# Patient Record
Sex: MALE | URBAN - METROPOLITAN AREA
[De-identification: names, ages, dates, MRNs, and addresses within clinical notes are randomized per-mention and may not be internally consistent; named-entity substitution may affect disease eponyms.]

---

## 2017-10-16 ENCOUNTER — APPOINTMENT (RX ONLY)
Dept: URBAN - METROPOLITAN AREA CLINIC 77 | Facility: CLINIC | Age: 22
Setting detail: DERMATOLOGY
End: 2017-10-16

## 2017-10-16 DIAGNOSIS — L21.8 OTHER SEBORRHEIC DERMATITIS: ICD-10-CM

## 2017-10-16 DIAGNOSIS — L50.3 DERMATOGRAPHIC URTICARIA: ICD-10-CM

## 2017-10-16 PROCEDURE — 99203 OFFICE O/P NEW LOW 30 MIN: CPT

## 2017-10-16 PROCEDURE — ? PRESCRIPTION

## 2017-10-16 PROCEDURE — ? COUNSELING

## 2017-10-16 PROCEDURE — ? TREATMENT REGIMEN

## 2017-10-16 RX ORDER — PIMECROLIMUS 10 MG/G
CREAM TOPICAL
Qty: 1 | Refills: 3 | Status: ERX | COMMUNITY
Start: 2017-10-16

## 2017-10-16 RX ORDER — SULFACETAMIDE SODIUM, SULFUR 100; 20 MG/G; MG/G
EMULSION TOPICAL
Qty: 1 | Refills: 5 | Status: ERX | COMMUNITY
Start: 2017-10-16

## 2017-10-16 RX ADMIN — PIMECROLIMUS: 10 CREAM TOPICAL at 18:26

## 2017-10-16 RX ADMIN — SULFACETAMIDE SODIUM, SULFUR: 100; 20 EMULSION TOPICAL at 18:26

## 2017-10-16 ASSESSMENT — LOCATION SIMPLE DESCRIPTION DERM
LOCATION SIMPLE: LEFT CHEEK
LOCATION SIMPLE: LEFT ANTERIOR NECK
LOCATION SIMPLE: RIGHT CHEEK

## 2017-10-16 ASSESSMENT — LOCATION DETAILED DESCRIPTION DERM
LOCATION DETAILED: LEFT INFERIOR CENTRAL MALAR CHEEK
LOCATION DETAILED: LEFT CLAVICULAR NECK
LOCATION DETAILED: RIGHT CENTRAL MALAR CHEEK

## 2017-10-16 ASSESSMENT — LOCATION ZONE DERM
LOCATION ZONE: NECK
LOCATION ZONE: FACE

## 2017-10-16 NOTE — PROCEDURE: TREATMENT REGIMEN
Detail Level: Zone
Plan: Location: face\\nPrescribe: Avar 10/2% cleanser, Elidel 1% cream\\nPt presents with erythematous scaly patches that can become irritating and bothersome\\nDiscussed with him that seborrheic dermatitis is a step further from dandruff, it is an inflammatory condition triggered with stress, lack of sleep, and also has a genetic component.\\nDiscussed with him that the regimen we will start him on is to help with inflammation\\nWill start him on Elidel cream, can use as much as necessary, is a non steroidal\\nDiscussed using a sulfa cleanser to help soothe and alleviate inflammation \\nDuring allergy season, recommend using Allegra bid to help calm down histamine production

## 2018-11-06 ENCOUNTER — APPOINTMENT (RX ONLY)
Dept: URBAN - METROPOLITAN AREA CLINIC 77 | Facility: CLINIC | Age: 23
Setting detail: DERMATOLOGY
End: 2018-11-06

## 2018-11-06 DIAGNOSIS — L21.8 OTHER SEBORRHEIC DERMATITIS: ICD-10-CM

## 2018-11-06 PROCEDURE — 99213 OFFICE O/P EST LOW 20 MIN: CPT

## 2018-11-06 PROCEDURE — ? TREATMENT REGIMEN

## 2018-11-06 PROCEDURE — ? PRESCRIPTION

## 2018-11-06 PROCEDURE — ? COUNSELING

## 2018-11-06 RX ORDER — DOXYCYCLINE HYCLATE 100 MG/1
TABLET, COATED ORAL
Qty: 60 | Refills: 2 | COMMUNITY
Start: 2018-11-06

## 2018-11-06 RX ADMIN — DOXYCYCLINE HYCLATE: 100 TABLET, COATED ORAL at 14:53

## 2018-11-06 ASSESSMENT — LOCATION ZONE DERM: LOCATION ZONE: FACE

## 2018-11-06 ASSESSMENT — LOCATION DETAILED DESCRIPTION DERM
LOCATION DETAILED: LEFT CENTRAL MALAR CHEEK
LOCATION DETAILED: RIGHT CENTRAL MALAR CHEEK

## 2018-11-06 ASSESSMENT — LOCATION SIMPLE DESCRIPTION DERM
LOCATION SIMPLE: LEFT CHEEK
LOCATION SIMPLE: RIGHT CHEEK

## 2018-11-06 NOTE — PROCEDURE: TREATMENT REGIMEN
Detail Level: Zone
Plan: Location: face\\nPrevious treatment: Avar 10/2% cleanser and elidel 1% cream\\nPrescribe: Doryx 120mg P.O. qd x 30 days \\nSample: Soolantra 1% cream and Avar 10/2% cream \\nPatient is here for a follow up after using the sulfa cleanser and elidel cream the first two months after seeing him\\nPt states the elidel cream was very irritating and he didn’t see much improvement\\nDiscussed with him that since the two topicals did not improve his dermatitis, will have him try samples of soolantra cream and avar cream.\\nInformed him that he has a combination of rosacea and seborrheic dermatitis, both are inflammatory\\Almas addition to the two creams, will have him start on an oral antibiotic to alleviate inflammation \\Christianoso went over the importance of starting on Allegra twice a day to reduce histamine production \\nWill have him follow up in 2-3 weeks to make sure he is improving

## 2018-11-14 ENCOUNTER — RX ONLY (OUTPATIENT)
Age: 23
Setting detail: RX ONLY
End: 2018-11-14

## 2018-11-14 RX ORDER — IVERMECTIN 10 MG/G
CREAM TOPICAL
Qty: 1 | Refills: 3 | Status: ERX | COMMUNITY
Start: 2018-11-14

## 2018-12-11 ENCOUNTER — APPOINTMENT (RX ONLY)
Dept: URBAN - METROPOLITAN AREA CLINIC 77 | Facility: CLINIC | Age: 23
Setting detail: DERMATOLOGY
End: 2018-12-11

## 2018-12-11 DIAGNOSIS — L21.8 OTHER SEBORRHEIC DERMATITIS: ICD-10-CM

## 2018-12-11 PROCEDURE — 99213 OFFICE O/P EST LOW 20 MIN: CPT

## 2018-12-11 PROCEDURE — ? TREATMENT REGIMEN

## 2018-12-11 PROCEDURE — ? COUNSELING

## 2018-12-11 ASSESSMENT — LOCATION SIMPLE DESCRIPTION DERM
LOCATION SIMPLE: RIGHT CHEEK
LOCATION SIMPLE: LEFT CHEEK

## 2018-12-11 ASSESSMENT — LOCATION DETAILED DESCRIPTION DERM
LOCATION DETAILED: LEFT CENTRAL MALAR CHEEK
LOCATION DETAILED: RIGHT CENTRAL MALAR CHEEK

## 2018-12-11 ASSESSMENT — LOCATION ZONE DERM: LOCATION ZONE: FACE

## 2018-12-11 NOTE — PROCEDURE: TREATMENT REGIMEN
Plan: Location: Face \\nPharmacy: [facility] \\nUsing: Doryx 120mg tablet, Avar LS 10%-2% topical cleanser, Elidel 1% topical cream \\n\\n\\[id number]\\n\\nPatient is here for a follow up \\nPatient states he has been using Elidel 1% topical cream, Avar LS 10%-2% topical cleanser only on a as needed basis \\nPatient states he is also taking oral medication Doryx 120mg \\nPatient states his skin has significantly improved and is very happy he no longer looks like Pa the red nose rain deer  \\n\\nDiscussed with patient that this is an auto immune condition triggered by lack of sleep, stress and has a major genetic component \\nDiscussed with patient that I did not cure him, but I did give him prescriptions for him to use when he has a flare up \\nInstructed patient that I want him to use Avar LS wash to help soothe out the skin \\nExplained to patient that I want him to use Doryx 12 0mg tablet on a as needed basis as well as Elidel 1% topical cream \\n\\nPatient wants reassurance about being able to drink excessively while taking oral medication \\nReassured patient that it is okay to drink while on oral medication, but if he is planning on drinking one night through that morning I do not want him taking oral antibiotics \\n\\nFollow up: as needed \\n\\n\\n—————————\\nPatient is here for a follow up after using the sulfa cleanser and elidel cream the first two months after seeing him\\nPt states the elidel cream was very irritating and he didn’t see much improvement\\nDiscussed with him that since the two topicals did not improve his dermatitis, will have him try samples of soolantra cream and avar cream.\\nInformed him that he has a combination of rosacea and seborrheic dermatitis, both are inflammatory\\Almas addition to the two creams, will have him start on an oral antibiotic to alleviate inflammation \\nAlso went over the importance of starting on Allegra twice a day to reduce histamine production \\nWill have him follow up in 2-3 weeks to make sure he is improving
Detail Level: Zone

## 2019-03-26 ENCOUNTER — APPOINTMENT (RX ONLY)
Dept: URBAN - METROPOLITAN AREA CLINIC 77 | Facility: CLINIC | Age: 24
Setting detail: DERMATOLOGY
End: 2019-03-26

## 2019-03-26 ENCOUNTER — RX ONLY (OUTPATIENT)
Age: 24
Setting detail: RX ONLY
End: 2019-03-26

## 2019-03-26 DIAGNOSIS — L81.4 OTHER MELANIN HYPERPIGMENTATION: ICD-10-CM

## 2019-03-26 DIAGNOSIS — L21.8 OTHER SEBORRHEIC DERMATITIS: ICD-10-CM

## 2019-03-26 PROCEDURE — ? COUNSELING

## 2019-03-26 PROCEDURE — ? PRESCRIPTION

## 2019-03-26 PROCEDURE — 99213 OFFICE O/P EST LOW 20 MIN: CPT

## 2019-03-26 PROCEDURE — ? TREATMENT REGIMEN

## 2019-03-26 RX ORDER — IVERMECTIN 10 MG/G
CREAM TOPICAL
Qty: 1 | Refills: 3 | Status: ERX

## 2019-03-26 RX ORDER — PHARMACY COMPOUNDING ACCESSORY
EACH MISCELLANEOUS
Qty: 1 | Refills: 3 | Status: ERX | COMMUNITY
Start: 2019-03-26

## 2019-03-26 RX ORDER — DOXYCYCLINE HYCLATE 50 MG/1
TABLET, DELAYED RELEASE ORAL
Qty: 120 | Refills: 3 | Status: ERX

## 2019-03-26 RX ORDER — DOXYCYCLINE HYCLATE 50 MG/1
TABLET, DELAYED RELEASE ORAL
Qty: 120 | Refills: 3 | Status: ERX | COMMUNITY
Start: 2019-03-26

## 2019-03-26 RX ADMIN — Medication: at 14:25

## 2019-03-26 ASSESSMENT — LOCATION DETAILED DESCRIPTION DERM
LOCATION DETAILED: RIGHT CENTRAL MALAR CHEEK
LOCATION DETAILED: LEFT CENTRAL MALAR CHEEK

## 2019-03-26 ASSESSMENT — LOCATION ZONE DERM: LOCATION ZONE: FACE

## 2019-03-26 ASSESSMENT — LOCATION SIMPLE DESCRIPTION DERM
LOCATION SIMPLE: RIGHT CHEEK
LOCATION SIMPLE: LEFT CHEEK

## 2019-03-26 NOTE — PROCEDURE: TREATMENT REGIMEN
Plan: Location: Face \\nPharmacy: [facility] \\nContinue using: Doryx 120mg tablet, soolantra 1% cream \\nDoesn’t like: Avar 10/2% cleanser or Elidel 1% cream \\n\\nPatient is here for a follow up after continuously using soolantra cream and Doryx 120mg\\nPatient states he tried using Elidel 1% topical cream, Avar LS 10%-2% topical cleanser but finds it ineffective \\nPatient states he is also taking oral medication Doryx 120mg \\nPatient states his skin has significantly improved and is very content with the treatment regimen \\Almas addition, he has been doing facials that have also helped his skin \\nDiscussed with patient that this is an auto immune condition triggered by lack of sleep, stress and has a major genetic component \\nDiscussed with patient that I did not cure him, but I did give him prescriptions for him to use when he has a flare up \\nToday will refill his rx and patient understands to use on an as needed basis \\nFollow up: as needed
Detail Level: Zone
Plan: Location: face\\nPrescribed: HQ 4% Tretinoin 0.025% compound cream QHS\\n\\nPt has hyperpigmentation on face today.\\nDiscussed with pt that this pigmentation is due to sun damage to the skin, advised patient to use SPF daily.\\nDiscussed with pt that we will start patient on HQ 4% Tretinoin 0.05% to use nightly.\\nDiscussed with pt to apply a pea size amount to face, pushing into pores. \\nDiscussed with pt to avoid areas around eyes, nasal crease, and around the lips since skin is thin and may get irritated easily.\\n\\nDiscussed with pt that it will come in a box that says keep refrigerated.\\nDiscussed with pt that they do not need to keep it refrigerated, but have to keep it in a dark place since sunlight may oxidize it.\\nDiscussed with pt that if skin becomes irritated while using cream, then pt can change frequency to every other night, or every other other night, etc.\\nDiscussed with pt that results are not immediate and may take up to a month to notice change.\\nAdvised pt to apply a moisturizing cream such as Cerave afterwards to help reestablish a healthy skin barrier.\\nF/u as needed

## 2019-11-27 ENCOUNTER — RX ONLY (OUTPATIENT)
Age: 24
Setting detail: RX ONLY
End: 2019-11-27

## 2020-01-07 ENCOUNTER — RX ONLY (OUTPATIENT)
Age: 25
Setting detail: RX ONLY
End: 2020-01-07

## 2020-01-07 ENCOUNTER — APPOINTMENT (RX ONLY)
Dept: URBAN - METROPOLITAN AREA CLINIC 77 | Facility: CLINIC | Age: 25
Setting detail: DERMATOLOGY
End: 2020-01-07

## 2020-01-07 DIAGNOSIS — L738 OTHER SPECIFIED DISEASES OF HAIR AND HAIR FOLLICLES: ICD-10-CM

## 2020-01-07 DIAGNOSIS — L663 OTHER SPECIFIED DISEASES OF HAIR AND HAIR FOLLICLES: ICD-10-CM

## 2020-01-07 DIAGNOSIS — L73.9 FOLLICULAR DISORDER, UNSPECIFIED: ICD-10-CM

## 2020-01-07 DIAGNOSIS — L21.8 OTHER SEBORRHEIC DERMATITIS: ICD-10-CM

## 2020-01-07 PROBLEM — L02.92 FURUNCLE, UNSPECIFIED: Status: ACTIVE | Noted: 2020-01-07

## 2020-01-07 PROCEDURE — ? COUNSELING

## 2020-01-07 PROCEDURE — ? PRESCRIPTION

## 2020-01-07 PROCEDURE — ? TREATMENT REGIMEN

## 2020-01-07 PROCEDURE — 99213 OFFICE O/P EST LOW 20 MIN: CPT

## 2020-01-07 RX ORDER — CLINDAMYCIN PHOSPHATE 10 MG/G
AEROSOL, FOAM TOPICAL
Qty: 1 | Refills: 3 | Status: ERX | COMMUNITY
Start: 2020-01-07

## 2020-01-07 RX ORDER — CLINDAMYCIN PHOSPHATE 1 %
KIT TOPICAL
Qty: 1 | Refills: 0 | Status: ERX | COMMUNITY
Start: 2020-01-07

## 2020-01-07 RX ORDER — KETOCONAZOLE 20 MG/ML
SHAMPOO TOPICAL
Qty: 1 | Refills: 11 | Status: ERX | COMMUNITY
Start: 2020-01-07

## 2020-01-07 RX ORDER — IVERMECTIN 10 MG/G
CREAM TOPICAL
Qty: 1 | Refills: 3 | Status: ERX

## 2020-01-07 RX ADMIN — CLINDAMYCIN PHOSPHATE: KIT at 00:00

## 2020-01-07 RX ADMIN — CLINDAMYCIN PHOSPHATE: 10 AEROSOL, FOAM TOPICAL at 00:00

## 2020-01-07 RX ADMIN — KETOCONAZOLE: 20 SHAMPOO TOPICAL at 00:00

## 2020-01-07 ASSESSMENT — LOCATION DETAILED DESCRIPTION DERM
LOCATION DETAILED: RIGHT CENTRAL MALAR CHEEK
LOCATION DETAILED: LEFT CENTRAL MALAR CHEEK

## 2020-01-07 ASSESSMENT — LOCATION SIMPLE DESCRIPTION DERM
LOCATION SIMPLE: LEFT CHEEK
LOCATION SIMPLE: RIGHT CHEEK

## 2020-01-07 ASSESSMENT — LOCATION ZONE DERM: LOCATION ZONE: FACE

## 2020-01-07 NOTE — PROCEDURE: TREATMENT REGIMEN
Detail Level: Zone
Samples Given: Plexion topical cream
Plan: Location: Face \\nPharmacy: [facility] \\nPrescribed: Clindacin ETZ 1 % topical kit (Apply to affected areas daily QD to BID as needed for flares)\\nKetoconazole 2 % shampoo  (apply daily onto scalp once weekly as needed for flare ups)\\nContinue using: Doryx 120mg tablet, soolantra 1% cream \\nDoesn’t like: Avar 10/2% cleanser or Elidel 1% cream \\n\\n01/07/2020\\n\\n\\n****WILL REFILL DORYX 120MG AND SOOLANTRA 1% TOPICAL CREAM AT TODAY'S VISIT****\\n\\n\\nPatient is here for a follow up \\nPatient has been using Doryx oral medication and Soolantra topical cream daily and states he’s been able to maintain condition \\nHowever he states he would like something a little stronger to control flares ups\\nToday I will be refilling his current medication Doryx and  Soolantro and in addition I will be adding Clindacin ETZ whipes and Ketoconazole topical cleanser to be used as needed to control flares.\\nHe mentions having irritation after shaving, so for this I will be prescribing Evoclin topical foam to be applied when experiencing flares or irritation from shaving. \\nPatient is to continue current treatment regimen and follow up in 6 weeks \\n\\n\\nPatient is to follow up in 6 weeks \\n—————————————-\\nPatient is here for a follow up after continuously using soolantra cream and Doryx 120mg\\nPatient states he tried using Elidel 1% topical cream, Avar LS 10%-2% topical cleanser but finds it ineffective \\nPatient states he is also taking oral medication Doryx 120mg \\nPatient states his skin has significantly improved and is very content with the treatment regimen \\Almas addition, he has been doing facials that have also helped his skin \\nDiscussed with patient that this is an auto immune condition triggered by lack of sleep, stress and has a major genetic component \\nDiscussed with patient that I did not cure him, but I did give him prescriptions for him to use when he has a flare up \\nToday will refill his rx and patient understands to use on an as needed basis \\nFollow up: as needed
Plan: Location: bearded area \\nPrescribe:  Evoclin 1 % topical foam (Apply to affected area after showering daily as needed for flare ups. Leave on.)\\n\\nDiscussed with pt that folliculitis is a common skin condition in which hair follicles become inflamed.\\nDiscussed with pt that this is a form of acne can be caused by genetic or hormonal related factors.\\nAdvised acne may be treated with topical and oral antibiotics, or hormonal controlling medications.\\nAlso discussed the possibility of a cure with Accutane, advised patient to consider accutane, Discussed pros and cons, side effects and benefits.\\n\\nDiscussed with pt that we will also prescribe Evoclin foam to apply after showering.\\nDiscussed with pt to leave on and do daily as needed for flare ups.\\nFollow up as needed.